# Patient Record
Sex: FEMALE | Employment: UNEMPLOYED | URBAN - METROPOLITAN AREA
[De-identification: names, ages, dates, MRNs, and addresses within clinical notes are randomized per-mention and may not be internally consistent; named-entity substitution may affect disease eponyms.]

---

## 2018-02-05 ENCOUNTER — HOSPITAL ENCOUNTER (EMERGENCY)
Age: 28
Discharge: HOME | End: 2018-02-05
Attending: EMERGENCY MEDICINE
Payer: SELF-PAY

## 2018-02-05 VITALS
SYSTOLIC BLOOD PRESSURE: 111 MMHG | OXYGEN SATURATION: 98 % | BODY MASS INDEX: 31.89 KG/M2 | RESPIRATION RATE: 16 BRPM | WEIGHT: 180 LBS | TEMPERATURE: 98.1 F | HEART RATE: 85 BPM | HEIGHT: 63 IN | DIASTOLIC BLOOD PRESSURE: 78 MMHG

## 2018-02-05 DIAGNOSIS — B96.89 BV (BACTERIAL VAGINOSIS): Primary | ICD-10-CM

## 2018-02-05 DIAGNOSIS — N76.0 BV (BACTERIAL VAGINOSIS): Primary | ICD-10-CM

## 2018-02-05 LAB
APPEARANCE UR: CLEAR
BACTERIA URNS QL MICRO: NEGATIVE /HPF
BILIRUB UR QL: NEGATIVE
COLOR UR: YELLOW
EPITH CASTS URNS QL MICRO: NORMAL /LPF (ref 0–5)
GLUCOSE UR STRIP.AUTO-MCNC: NEGATIVE MG/DL
HCG UR QL: NEGATIVE
HGB UR QL STRIP: NEGATIVE
KETONES UR QL STRIP.AUTO: NEGATIVE MG/DL
LEUKOCYTE ESTERASE UR QL STRIP.AUTO: ABNORMAL
NITRITE UR QL STRIP.AUTO: NEGATIVE
PH UR STRIP: 5 [PH] (ref 5–8)
PROT UR STRIP-MCNC: NEGATIVE MG/DL
RBC #/AREA URNS HPF: NEGATIVE /HPF (ref 0–5)
SERVICE CMNT-IMP: NORMAL
SP GR UR REFRACTOMETRY: 1.02 (ref 1–1.03)
UROBILINOGEN UR QL STRIP.AUTO: 0.2 EU/DL (ref 0.2–1)
WBC URNS QL MICRO: NORMAL /HPF (ref 0–4)
WET PREP GENITAL: NORMAL

## 2018-02-05 PROCEDURE — 81001 URINALYSIS AUTO W/SCOPE: CPT | Performed by: EMERGENCY MEDICINE

## 2018-02-05 PROCEDURE — 87491 CHLMYD TRACH DNA AMP PROBE: CPT | Performed by: EMERGENCY MEDICINE

## 2018-02-05 PROCEDURE — 99283 EMERGENCY DEPT VISIT LOW MDM: CPT

## 2018-02-05 PROCEDURE — 81025 URINE PREGNANCY TEST: CPT | Performed by: EMERGENCY MEDICINE

## 2018-02-05 PROCEDURE — 87210 SMEAR WET MOUNT SALINE/INK: CPT | Performed by: EMERGENCY MEDICINE

## 2018-02-05 RX ORDER — METRONIDAZOLE 500 MG/1
500 TABLET ORAL 2 TIMES DAILY
Qty: 14 TAB | Refills: 0 | Status: SHIPPED | OUTPATIENT
Start: 2018-02-05 | End: 2018-02-12

## 2018-02-05 NOTE — DISCHARGE INSTRUCTIONS
Bacterial Vaginosis: Care Instructions  Your Care Instructions    Bacterial vaginosis is a type of vaginal infection. It is caused by excess growth of certain bacteria that are normally found in the vagina. Symptoms can include itching, swelling, pain when you urinate or have sex, and a gray or yellow discharge with a \"fishy\" odor. It is not considered an infection that is spread through sexual contact. Although symptoms can be annoying and uncomfortable, bacterial vaginosis does not usually cause other health problems. However, if you have it while you are pregnant, it can cause complications. While the infection may go away on its own, most doctors use antibiotics to treat it. You may have been prescribed pills or vaginal cream. With treatment, bacterial vaginosis usually clears up in 5 to 7 days. Follow-up care is a key part of your treatment and safety. Be sure to make and go to all appointments, and call your doctor if you are having problems. It's also a good idea to know your test results and keep a list of the medicines you take. How can you care for yourself at home? · Take your antibiotics as directed. Do not stop taking them just because you feel better. You need to take the full course of antibiotics. · Do not eat or drink anything that contains alcohol if you are taking metronidazole (Flagyl). · Keep using your medicine if you start your period. Use pads instead of tampons while using a vaginal cream or suppository. Tampons can absorb the medicine. · Wear loose cotton clothing. Do not wear nylon and other materials that hold body heat and moisture close to the skin. · Do not scratch. Relieve itching with a cold pack or a cool bath. · Do not wash your vaginal area more than once a day. Use plain water or a mild, unscented soap. Do not douche. When should you call for help?   Watch closely for changes in your health, and be sure to contact your doctor if:  ? · You have unexpected vaginal bleeding. ? · You have a fever. ? · You have new or increased pain in your vagina or pelvis. ? · You are not getting better after 1 week. ? · Your symptoms return after you finish the course of your medicine. Where can you learn more? Go to http://glenna-trisha.info/. Barbra Anaya in the search box to learn more about \"Bacterial Vaginosis: Care Instructions. \"  Current as of: October 13, 2016  Content Version: 11.4  © 4150-3125 [employer]. Care instructions adapted under license by [employer] (which disclaims liability or warranty for this information). If you have questions about a medical condition or this instruction, always ask your healthcare professional. [address] any warranty or liability for your use of this information.

## 2018-02-05 NOTE — ED NOTES
Jay Grewal is a 32 y.o. female that was discharged in good condition. The patients diagnosis, condition and treatment were explained to  patient and aftercare instructions were given. The patient verbalized understanding. Patient armband removed and shredded.

## 2018-02-05 NOTE — ED PROVIDER NOTES
EMERGENCY DEPARTMENT HISTORY AND PHYSICAL EXAM    1:37 PM      Date: 2/5/2018  Patient Name: Bushra Carter    History of Presenting Illness     Chief Complaint   Patient presents with    Vaginal Discharge         History Provided By: Patient    Chief Complaint: Vaginal Discharge   Duration: 1 Weeks  Timing:  Constant  Location: vaginal area   Quality: clear/white   Severity: Moderate  Modifying Factors: Nothing makes the Sx better or worse   Associated Symptoms: denies any other associated signs or symptoms      Additional History (Context): Bushra Carter is a 32 y.o. female with PMHx of BV and yeast infection who presents c/o constant moderate vaginal discharged onset x 1 week ago. Pt describes the vaginal discharge as clear/white. Nothing makes the Sx better or worse. LMP was x 2 weeks ago. No hx of STD. Admits to hx of yeast infection and BV (treated). She is sexually active. No concern for STD. No tobacco or alcohol use. Denies dysuria, hematuria , fever, body aches, vaginal itching. Denies any further complaints or symptoms at the moment. Past History     Past Medical History:  History reviewed. No pertinent past medical history. Past Surgical History:  History reviewed. No pertinent surgical history. Family History:  History reviewed. No pertinent family history. Social History:  Social History   Substance Use Topics    Smoking status: Never Smoker    Smokeless tobacco: Never Used    Alcohol use Yes       Allergies:  No Known Allergies      Review of Systems       Review of Systems   Constitutional: Negative for fever. HENT: Negative for sore throat. Eyes: Negative for redness. Respiratory: Negative for shortness of breath and wheezing. Gastrointestinal: Negative for abdominal pain and nausea. Genitourinary: Positive for vaginal discharge. Negative for dysuria. Musculoskeletal: Negative for neck stiffness. Skin: Negative for pallor.    Neurological: Negative for headaches. Hematological: Does not bruise/bleed easily. All other systems reviewed and are negative. Physical Exam     Visit Vitals    /78 (BP 1 Location: Left arm, BP Patient Position: At rest)    Pulse 85    Temp 98.1 °F (36.7 °C)    Resp 16    Ht 5' 3\" (1.6 m)    Wt 81.6 kg (180 lb)    LMP 01/22/2018    SpO2 98%    BMI 31.89 kg/m2         Physical Exam   Constitutional: She is oriented to person, place, and time. She appears well-developed and well-nourished. No distress. HENT:   Head: Normocephalic and atraumatic. Mouth/Throat: Oropharynx is clear and moist.   Eyes: Conjunctivae are normal. Pupils are equal, round, and reactive to light. No scleral icterus. Neck: Normal range of motion. Neck supple. Cardiovascular: Intact distal pulses. Capillary refill < 3 seconds   Pulmonary/Chest: Effort normal and breath sounds normal. No respiratory distress. She has no wheezes. Lungs are clear    Abdominal: Soft. Bowel sounds are normal. She exhibits no distension. There is no tenderness. There is no CVA tenderness. Musculoskeletal: Normal range of motion. She exhibits no edema. Lymphadenopathy:     She has no cervical adenopathy. Neurological: She is alert and oriented to person, place, and time. No cranial nerve deficit. Skin: Skin is warm and dry. She is not diaphoretic. Nursing note and vitals reviewed.         Diagnostic Study Results     Labs -  Recent Results (from the past 12 hour(s))   URINALYSIS W/ RFLX MICROSCOPIC    Collection Time: 02/05/18  1:39 PM   Result Value Ref Range    Color YELLOW      Appearance CLEAR      Specific gravity 1.016 1.005 - 1.030      pH (UA) 5.0 5.0 - 8.0      Protein NEGATIVE  NEG mg/dL    Glucose NEGATIVE  NEG mg/dL    Ketone NEGATIVE  NEG mg/dL    Bilirubin NEGATIVE  NEG      Blood NEGATIVE  NEG      Urobilinogen 0.2 0.2 - 1.0 EU/dL    Nitrites NEGATIVE  NEG      Leukocyte Esterase TRACE (A) NEG     HCG URINE, QL    Collection Time: 02/05/18  1:39 PM   Result Value Ref Range    HCG urine, QL NEGATIVE  NEG     URINE MICROSCOPIC ONLY    Collection Time: 02/05/18  1:39 PM   Result Value Ref Range    WBC 0 to 3 0 - 4 /hpf    RBC NEGATIVE  0 - 5 /hpf    Epithelial cells 1+ 0 - 5 /lpf    Bacteria NEGATIVE  NEG /hpf   WET PREP    Collection Time: 02/05/18  1:45 PM   Result Value Ref Range    Special Requests: NO SPECIAL REQUESTS      Wet prep MODERATE  CLUE CELLS PRESENT        Wet prep NO TRICHOMONAS SEEN      Wet prep NO YEAST SEEN         Radiologic Studies -   No orders to display         Medical Decision Making   I am the first provider for this patient. I reviewed the vital signs, available nursing notes, past medical history, past surgical history, family history and social history. Vital Signs-Reviewed the patient's vital signs. Pulse Oximetry Analysis -  98 % on RA, normal     Records Reviewed: Nursing Notes (Time of Review: 1:37 PM)    Provider Notes (Medical Decision Making): ddx BV, yeast infection, uti, other gyn infection    MDM    Medications - No data to display    Procedures: Pelvic Exam  Date/Time: 2/5/2018 1:49 PM  Performed by: attending  Procedure duration:  5 minutes. Exam assisted by:  Female RN, Glenna Zuniga . Type of exam performed: bimanual.    External genitalia appearance: normal.    Vaginal exam:  discharge. The amount of discharge was:  scant. The discharge was cottage cheese like and white. Cervical exam:  normal.    Specimen(s) collected:  chlamydia, GC and vaginal culture. Bimanual exam:  normal.    Patient tolerance: Patient tolerated the procedure well with no immediate complications          ED Course: Progress Notes, Reevaluation, and Consults:  (+)BV  GC chlamydia pending    I have reassessed the patient. I have discussed the workup, results and plan with the patient and patient is in agreement. Patient will be prescribed flagyl. Patient was discharge in stable condition.  Patient was given outpatient follow up. Patient is to return to emergency department if any new or worsening condition. 2:37 PM February 05, 2018       Diagnosis     Clinical Impression:   1. BV (bacterial vaginosis)        Disposition: discharged. Follow-up Information     Follow up With Details Comments [address] Call in 2 days For Follow-Up [address]. [address]) [phone]    Your OBGYN  Schedule an appointment as soon as possible for a visit in 2 days For Follow-Up            Patient's Medications   Start Taking    METRONIDAZOLE (FLAGYL) 500 MG TABLET    Take 1 Tab by mouth two (2) times a day for 7 days. Indications: Bacterial Vaginosis   Continue Taking    No medications on file   These Medications have changed    No medications on file   Stop Taking    No medications on file     _______________________________    Attestations:  Scribe Attestation     Diogenes Reeves acting as a scribe for and in the presence of Marilou Aguilar DO      February 05, 2018 at 1:37 PM       Provider Attestation:      I personally performed the services described in the documentation, reviewed the documentation, as recorded by the scribe in my presence, and it accurately and completely records my words and actions.  February 05, 2018 at 1:37 PM - Marilou Aguilar DO    _______________________________

## 2018-02-06 LAB
C TRACH RRNA SPEC QL NAA+PROBE: NEGATIVE
N GONORRHOEA RRNA SPEC QL NAA+PROBE: NEGATIVE
SPECIMEN SOURCE: NORMAL